# Patient Record
Sex: FEMALE | Race: ASIAN | NOT HISPANIC OR LATINO | Employment: UNEMPLOYED
[De-identification: names, ages, dates, MRNs, and addresses within clinical notes are randomized per-mention and may not be internally consistent; named-entity substitution may affect disease eponyms.]

---

## 2017-02-01 ENCOUNTER — HOSPITAL (OUTPATIENT)
Dept: OTHER | Age: 42
End: 2017-02-01
Attending: FAMILY MEDICINE

## 2017-02-01 LAB
% SATURATION: 24 % (ref 20–55)
A/G RATIO_: 1.4
ABS LYMPH: 1.6 K/CUMM (ref 1–3.5)
ABS MONO: 0.3 K/CUMM (ref 0.1–0.8)
ABS NEUTRO: 1.8 K/CUMM (ref 2–8)
ALBUMIN: 3.9 G/DL (ref 3.5–5)
ALK PHOS: 63 UNIT/L (ref 50–124)
ALT/GPT: 14 UNIT/L (ref 0–55)
ANION GAP SERPL CALC-SCNC: 10 MEQ/L (ref 10–20)
AST/GOT: 19 UNIT/L (ref 5–34)
BASOPHIL: 1 % (ref 0–1)
BILI TOTAL: 0.4 MG/DL (ref 0.2–1)
BUN SERPL-MCNC: 17 MG/DL (ref 6–20)
CALC TIBC: 375 UG/DL (ref 260–445)
CALCIUM: 9.5 MG/DL (ref 8.4–10.2)
CHLORIDE: 104 MEQ/L (ref 97–107)
CHOLESTEROL: 224 MG/DL
CREATININE: 0.72 MG/DL (ref 0.6–1.3)
CRP CARDIAC: 0.8 MG/L
DIFF_TYPE?: ABNORMAL
EOSINOPHIL: 5 % (ref 0–6)
FERRITIN: 37.8 NG/ML (ref 10–204)
FOLATE: 15 NG/ML (ref 7–31)
GLOBULIN_: 2.7 G/DL (ref 2–4.1)
GLUCOSE LVL: 86 MG/DL (ref 70–99)
HCT VFR BLD CALC: 38 % (ref 33–45)
HDLC SERPL-MCNC: 54 MG/DL (ref 40–60)
HEMOLYSIS 1+: NEGATIVE
HEMOLYSIS 2+: NEGATIVE
HEMOLYSIS 2+: NEGATIVE
HEMOLYSIS 3+: NEGATIVE
HGB BLD-MCNC: 13 G/DL (ref 11–15)
IMMATURE GRAN: 0.3 % (ref 0–0.3)
INSTR WBC: 3.9 K/CUMM (ref 4–11)
IRON LVL: 90 UG/DL (ref 35–135)
LDLC SERPL CALC-MCNC: 151 MG/DL
LIPEMIC 3+: NEGATIVE
LYMPHOCYTE: 40 %
MCH RBC QN AUTO: 31 PG (ref 25–35)
MCHC RBC AUTO-ENTMCNC: 34 G/DL (ref 32–37)
MCV RBC AUTO: 90 FL (ref 78–97)
MICROALB/CREAT RATIO: NORMAL UG/MG
MONOCYTE: 9 %
NEUTROPHIL: 45 %
NRBC BLD MANUAL-RTO: 0 % (ref 0–0.2)
PLATELET: 234 K/CUMM (ref 150–450)
POTASSIUM: 4.3 MEQ/L (ref 3.5–5.1)
PT IS FASTING: ABNORMAL
RBC # BLD: 4.22 M/CUMM (ref 3.7–5.2)
RDW: 11.9 % (ref 11.5–14.5)
SED RATE: 16 MM/HR (ref 0–20)
SODIUM: 139 MEQ/L (ref 136–145)
TCO2: 29 MEQ/L (ref 19–29)
TOTAL PROTEIN: 6.6 G/DL (ref 6.4–8.3)
TRANSFERRIN LEVEL: 268 MG/DL (ref 180–382)
TRIGL SERPL-MCNC: 97 MG/DL
TSH SERPL-ACNC: 1.36 MIU/ML (ref 0.4–5)
U CREATININE: 99.15 MG/DL
U MICROALBUMIN: <5 MG/L
UA APPEAR: CLEAR
UA BACTERIA: ABNORMAL
UA BILI: NEGATIVE
UA BLOOD: ABNORMAL
UA COLOR: YELLOW
UA EPITHELIAL: ABNORMAL
UA GLUCOSE: NEGATIVE
UA KETONES: NEGATIVE
UA LEUK EST: ABNORMAL
UA NITRITE: NEGATIVE
UA PH: 6 (ref 5–7)
UA PROTEIN: NEGATIVE
UA RBC: ABNORMAL
UA SPEC GRAV: 1.01 (ref 1.01–1.02)
UA UROBILINOGEN: 0.2 MG/DL (ref 0.2–1)
UA WBC: ABNORMAL
VIT D 25 OH LVL: 23.4 NG/ML
VITAMIN B12 LVL: 651 PG/ML (ref 213–816)
WBC # BLD: 3.9 K/CUMM (ref 4–11)

## 2017-04-21 ENCOUNTER — HOSPITAL (OUTPATIENT)
Dept: OTHER | Age: 42
End: 2017-04-21
Attending: FAMILY MEDICINE

## 2017-08-15 ENCOUNTER — HOSPITAL (OUTPATIENT)
Dept: OTHER | Age: 42
End: 2017-08-15
Attending: FAMILY MEDICINE

## 2017-08-15 LAB
ABS LYMPH: 1.8 K/CUMM (ref 1–3.5)
ABS MONO: 0.6 K/CUMM (ref 0.1–0.8)
ABS NEUTRO: 2.5 K/CUMM (ref 2–8)
BASOPHIL: 1 % (ref 0–1)
DIFF_TYPE?: NORMAL
EOSINOPHIL: 3 % (ref 0–6)
HCT VFR BLD CALC: 39 % (ref 33–45)
HEMOLYSIS 1+: NEGATIVE
HGB BLD-MCNC: 13.2 G/DL (ref 11–15)
IMMATURE GRAN: 0.2 % (ref 0–0.3)
INSTR WBC: 5.1 K/CUMM (ref 4–11)
LYMPHOCYTE: 35 %
MCH RBC QN AUTO: 31 PG (ref 25–35)
MCHC RBC AUTO-ENTMCNC: 34 G/DL (ref 32–37)
MCV RBC AUTO: 90 FL (ref 78–97)
MONOCYTE: 11 %
NEUTROPHIL: 50 %
NRBC BLD MANUAL-RTO: 0 % (ref 0–0.2)
PLATELET: 234 K/CUMM (ref 150–450)
RBC # BLD: 4.32 M/CUMM (ref 3.7–5.2)
RDW: 11.6 % (ref 11.5–14.5)
VIT D 25 OH LVL: 57.4 NG/ML
WBC # BLD: 5.1 K/CUMM (ref 4–11)

## 2018-01-23 ENCOUNTER — LAB SERVICES (OUTPATIENT)
Dept: OTHER | Age: 43
End: 2018-01-23

## 2018-01-23 ENCOUNTER — CHARTING TRANS (OUTPATIENT)
Dept: OTHER | Age: 43
End: 2018-01-23

## 2018-01-24 ENCOUNTER — CHARTING TRANS (OUTPATIENT)
Dept: OTHER | Age: 43
End: 2018-01-24

## 2018-01-24 ENCOUNTER — HOSPITAL (OUTPATIENT)
Dept: OTHER | Age: 43
End: 2018-01-24
Attending: OBSTETRICS & GYNECOLOGY

## 2018-01-24 LAB
ESTRADIOL SERPL-MCNC: 24 PG/ML
FSH SERPL-ACNC: 65.2 MUNITS/ML
LH SERPL-ACNC: 31.2 MUNITS/ML
T4 FREE SERPL-MCNC: 1.6 NG/DL (ref 0.8–1.5)
TSH SERPL-ACNC: <0.008 MCUNITS/ML (ref 0.35–5)

## 2018-01-25 ENCOUNTER — CHARTING TRANS (OUTPATIENT)
Dept: OTHER | Age: 43
End: 2018-01-25

## 2018-01-25 LAB — BACTERIA UR CULT: NORMAL

## 2018-01-26 ENCOUNTER — HOSPITAL (OUTPATIENT)
Dept: OTHER | Age: 43
End: 2018-01-26
Attending: PSYCHIATRY & NEUROLOGY

## 2018-01-26 LAB
ANA TITER: <40
BUN SERPL-MCNC: 13 MG/DL (ref 6–20)
CREATININE: 0.68 MG/DL (ref 0.6–1.3)
FOLATE: 15 NG/ML (ref 7–31)
HEMOLYSIS 1+: NEGATIVE
HEMOLYSIS 1+: NEGATIVE
HEMOLYSIS 4+: NEGATIVE
HOMOCYSTEINE: 5.9 UMOL/L (ref 3–11.5)
SED RATE: 58 MM/HR (ref 0–20)
SS-A IGG: <0.2 AI (ref 0–0.9)
SS-B IGG: <0.2 AI (ref 0–0.9)
VITAMIN B12 LVL: 378 PG/ML (ref 213–816)

## 2018-02-05 ENCOUNTER — HOSPITAL (OUTPATIENT)
Dept: OTHER | Age: 43
End: 2018-02-05
Attending: PSYCHIATRY & NEUROLOGY

## 2018-02-27 ENCOUNTER — CHARTING TRANS (OUTPATIENT)
Dept: OTHER | Age: 43
End: 2018-02-27

## 2018-03-17 ENCOUNTER — HOSPITAL (OUTPATIENT)
Dept: OTHER | Age: 43
End: 2018-03-17
Attending: INTERNAL MEDICINE

## 2018-04-12 ENCOUNTER — HOSPITAL (OUTPATIENT)
Dept: OTHER | Age: 43
End: 2018-04-12
Attending: FAMILY MEDICINE

## 2018-04-12 LAB
CONTROL LINE: PRESENT
CONTROL LINE: PRESENT
INFLU A POC: NORMAL
INFLU B POC: NORMAL
Lab: CLEAR
Lab: CLEAR
STREP POC: NEGATIVE

## 2018-11-02 VITALS
BODY MASS INDEX: 23.37 KG/M2 | SYSTOLIC BLOOD PRESSURE: 90 MMHG | WEIGHT: 127 LBS | HEIGHT: 62 IN | DIASTOLIC BLOOD PRESSURE: 68 MMHG

## 2018-12-08 ENCOUNTER — HOSPITAL (OUTPATIENT)
Dept: OTHER | Age: 43
End: 2018-12-08
Attending: INTERNAL MEDICINE

## 2019-08-29 ENCOUNTER — OFFICE VISIT (OUTPATIENT)
Dept: NEUROLOGY | Facility: CLINIC | Age: 44
End: 2019-08-29
Payer: COMMERCIAL

## 2019-08-29 VITALS
HEART RATE: 82 BPM | DIASTOLIC BLOOD PRESSURE: 62 MMHG | WEIGHT: 125 LBS | RESPIRATION RATE: 16 BRPM | BODY MASS INDEX: 22.15 KG/M2 | SYSTOLIC BLOOD PRESSURE: 102 MMHG | HEIGHT: 63 IN

## 2019-08-29 DIAGNOSIS — R42 DIZZINESS: ICD-10-CM

## 2019-08-29 DIAGNOSIS — H93.13 TINNITUS OF BOTH EARS: ICD-10-CM

## 2019-08-29 DIAGNOSIS — R41.3 MEMORY LOSS: Primary | ICD-10-CM

## 2019-08-29 DIAGNOSIS — H53.8 BLURRY VISION: ICD-10-CM

## 2019-08-29 PROCEDURE — 99204 OFFICE O/P NEW MOD 45 MIN: CPT | Performed by: OTHER

## 2019-08-29 RX ORDER — LEVOTHYROXINE SODIUM 0.05 MG/1
50 TABLET ORAL
COMMUNITY

## 2019-08-29 RX ORDER — BIOTIN 1 MG
1 TABLET ORAL DAILY
COMMUNITY

## 2019-08-29 RX ORDER — ALENDRONATE SODIUM 70 MG/1
70 TABLET ORAL WEEKLY
COMMUNITY

## 2019-08-29 NOTE — H&P
[facility] New Patient / Consult Visit    Jd Jaramillo is a 37year old female. Referring MD: No ref.  provider found    Patient presents with:  Memory Loss: C/O of short term memory loss      HPI:    Sabine Radford pains, bowel / bladder incontinence or mood issues.      Past Medical History:   Diagnosis Date   • Hypothyroidism    • Osteoporosis      Past Surgical History:   Procedure Laterality Date   • ANESTH, SECTION      2   • UMBILICAL HERNIA REPAIR Pupils: equally round and reactive to light with direct and consensual responses, normal accomodation   Visual acuity: Normal              Visual fields: Normal  Oculomotor/Trochlear/Abducens:    Eye Movements: EOMI without nystagmus  Trigeminal:   Faci addition I will check for reversible causes of cognitive impairment, as noted below ( B12)    Given the mild findings on exam at this time, it was discussed with patient that she did not need any medication at this time.  She was advised that we would wait

## 2019-09-18 ENCOUNTER — HOSPITAL ENCOUNTER (OUTPATIENT)
Dept: MRI IMAGING | Facility: HOSPITAL | Age: 44
Discharge: HOME | End: 2019-09-18
Attending: Other
Payer: COMMERCIAL

## 2019-09-18 DIAGNOSIS — H53.8 BLURRY VISION: ICD-10-CM

## 2019-09-18 DIAGNOSIS — R42 DIZZINESS: ICD-10-CM

## 2019-09-18 DIAGNOSIS — H93.13 TINNITUS OF BOTH EARS: ICD-10-CM

## 2019-09-18 DIAGNOSIS — R41.3 MEMORY LOSS: ICD-10-CM

## 2019-09-18 PROCEDURE — 70553 MRI BRAIN STEM W/O & W/DYE: CPT | Performed by: OTHER

## 2019-09-18 PROCEDURE — A9575 INJ GADOTERATE MEGLUMI 0.1ML: HCPCS | Performed by: OTHER

## 2019-09-18 PROCEDURE — 70544 MR ANGIOGRAPHY HEAD W/O DYE: CPT | Performed by: OTHER

## 2019-10-09 ENCOUNTER — TELEPHONE (OUTPATIENT)
Dept: NEUROLOGY | Facility: CLINIC | Age: 44
End: 2019-10-09

## 2019-10-09 NOTE — TELEPHONE ENCOUNTER
Called patient; discussed findings on MRA - possible ICA aneurysm vs infundibulum - will plan to monitor and consider CTA brain next visit - reassurance given to patient

## 2019-11-18 ENCOUNTER — HOSPITAL (OUTPATIENT)
Dept: OTHER | Age: 44
End: 2019-11-18
Attending: NURSE PRACTITIONER

## 2019-11-18 LAB
CONTROL LINE: PRESENT
Lab: CLEAR
STREP POC: NEGATIVE

## 2019-11-25 ENCOUNTER — OFFICE VISIT (OUTPATIENT)
Dept: NEUROLOGY | Facility: CLINIC | Age: 44
End: 2019-11-25
Payer: COMMERCIAL

## 2019-11-25 VITALS
SYSTOLIC BLOOD PRESSURE: 110 MMHG | DIASTOLIC BLOOD PRESSURE: 80 MMHG | BODY MASS INDEX: 23 KG/M2 | WEIGHT: 128 LBS | HEART RATE: 68 BPM

## 2019-11-25 DIAGNOSIS — R41.3 MEMORY LOSS: ICD-10-CM

## 2019-11-25 DIAGNOSIS — H53.8 BLURRY VISION: ICD-10-CM

## 2019-11-25 DIAGNOSIS — H93.13 TINNITUS OF BOTH EARS: ICD-10-CM

## 2019-11-25 DIAGNOSIS — R90.89 ABNORMAL MRA, BRAIN: ICD-10-CM

## 2019-11-25 DIAGNOSIS — R51.9 CHRONIC NONINTRACTABLE HEADACHE, UNSPECIFIED HEADACHE TYPE: Primary | ICD-10-CM

## 2019-11-25 DIAGNOSIS — R42 DIZZINESS: ICD-10-CM

## 2019-11-25 DIAGNOSIS — G89.29 CHRONIC NONINTRACTABLE HEADACHE, UNSPECIFIED HEADACHE TYPE: Primary | ICD-10-CM

## 2019-11-25 PROCEDURE — 99214 OFFICE O/P EST MOD 30 MIN: CPT | Performed by: OTHER

## 2019-11-25 RX ORDER — AMITRIPTYLINE HYDROCHLORIDE 10 MG/1
TABLET, FILM COATED ORAL
Qty: 60 TABLET | Refills: 2 | Status: SHIPPED | OUTPATIENT
Start: 2019-11-25 | End: 2020-02-27

## 2019-11-25 NOTE — PROGRESS NOTES
[facility] Progress Note    HPI  No chief complaint on file. As per my initial H&P from 8/29/19,   \" Osmar Bishop is a 37year old, who presents for evaluation of memory loss.       Patient states she has not been having any menst week, with pain behind R eye with light /sound sensitivity and no n/v. She still feels her memory is poor and forgets lists that she is making. She has been sleeping a bit better than in the past; states she wakes up in the middle of the night still. calculated from the following:    Height as of 8/29/19: 63\". Weight as of this encounter: 128 lb (58.1 kg).     Gen: well developed, well nourished, no acute distress  HEENT: normocephalic  Heart; normal H0/W5, regular rate and rhythm  Lungs: clear to a The expected major intracranial flow voids are present. MRA BRAIN  There is a 2.8 mm outpouching off the supra clinoid left internal carotid artery extending in a caudal direction. This may be due to an infundibulum. This may be due to an aneurysm. 10 mg nightly, then increasing to 20 mg nightly after 1 week. Patient was advised of potential side effects, including but not limited to excessive sedation, constipation, dry mouth, dizziness, and/or weight gain.   She was advised to notify office with an

## 2019-11-26 ENCOUNTER — HOSPITAL (OUTPATIENT)
Dept: OTHER | Age: 44
End: 2019-11-26
Attending: EMERGENCY MEDICINE

## 2019-11-27 ENCOUNTER — HOSPITAL (OUTPATIENT)
Dept: OTHER | Age: 44
End: 2019-11-27
Attending: EMERGENCY MEDICINE

## 2019-11-27 LAB
ABS LYMPH: 2.4 K/CUMM (ref 1–3.5)
ABS MONO: 0.7 K/CUMM (ref 0.1–0.8)
ABS NEUTRO: 4.3 K/CUMM (ref 2–8)
ANION GAP SERPL CALC-SCNC: 11 MEQ/L (ref 10–20)
BASOPHIL: 0 % (ref 0–1)
BUN SERPL-MCNC: 10 MG/DL (ref 6–20)
CALCIUM: 9.7 MG/DL (ref 8.4–10.2)
CHLORIDE: 104 MEQ/L (ref 97–107)
CREATININE: 0.82 MG/DL (ref 0.6–1.3)
CRP INFLAMMATION: 1.5 MG/L (ref 0.1–8.2)
DIFF_TYPE?: NORMAL
EOSINOPHIL: 1 % (ref 0–6)
GLUCOSE LVL: 106 MG/DL (ref 70–99)
HCT VFR BLD CALC: 39 % (ref 33–45)
HEMOLYSIS 2+: NEGATIVE
HGB BLD-MCNC: 13.2 G/DL (ref 11–15)
IMMATURE GRAN: 0.1 % (ref 0–0.3)
INSTR WBC: 7.6 K/CUMM (ref 4–11)
LYMPHOCYTE: 32 %
MCH RBC QN AUTO: 30 PG (ref 25–35)
MCHC RBC AUTO-ENTMCNC: 34 G/DL (ref 32–37)
MCV RBC AUTO: 89 FL (ref 78–97)
MONOCYTE: 9 %
NEUTROPHIL: 57 %
NRBC BLD MANUAL-RTO: 0 % (ref 0–0.2)
PLATELET: 306 K/CUMM (ref 150–450)
POTASSIUM: 3.9 MEQ/L (ref 3.5–5.1)
RBC # BLD: 4.36 M/CUMM (ref 3.7–5.2)
RDW: 11.6 % (ref 11.5–14.5)
SED RATE: 30 MM/HR (ref 0–20)
SODIUM: 141 MEQ/L (ref 136–145)
TCO2: 30 MEQ/L (ref 19–29)
WBC # BLD: 7.6 K/CUMM (ref 4–11)

## 2019-12-03 ENCOUNTER — HOSPITAL ENCOUNTER (OUTPATIENT)
Dept: CT IMAGING | Facility: HOSPITAL | Age: 44
Discharge: HOME | End: 2019-12-03
Attending: Other
Payer: COMMERCIAL

## 2019-12-03 ENCOUNTER — APPOINTMENT (OUTPATIENT)
Dept: LAB | Facility: HOSPITAL | Age: 44
End: 2019-12-03
Attending: Other
Payer: COMMERCIAL

## 2019-12-03 DIAGNOSIS — R42 DIZZINESS: ICD-10-CM

## 2019-12-03 DIAGNOSIS — H53.8 BLURRY VISION: ICD-10-CM

## 2019-12-03 DIAGNOSIS — R90.89 ABNORMAL MRA, BRAIN: ICD-10-CM

## 2019-12-03 DIAGNOSIS — H93.13 TINNITUS OF BOTH EARS: ICD-10-CM

## 2019-12-03 PROCEDURE — 70498 CT ANGIOGRAPHY NECK: CPT | Performed by: OTHER

## 2019-12-03 PROCEDURE — 70496 CT ANGIOGRAPHY HEAD: CPT | Performed by: OTHER

## 2019-12-03 PROCEDURE — 36415 COLL VENOUS BLD VENIPUNCTURE: CPT | Performed by: OTHER

## 2019-12-03 PROCEDURE — 82607 VITAMIN B-12: CPT | Performed by: OTHER

## 2019-12-03 PROCEDURE — 82565 ASSAY OF CREATININE: CPT

## 2019-12-05 ENCOUNTER — HOSPITAL ENCOUNTER (OUTPATIENT)
Dept: ELECTROPHYSIOLOGY | Facility: HOSPITAL | Age: 44
Discharge: HOME | End: 2019-12-05
Attending: Other
Payer: COMMERCIAL

## 2019-12-05 DIAGNOSIS — R41.3 MEMORY LOSS: ICD-10-CM

## 2019-12-05 DIAGNOSIS — R42 DIZZINESS: ICD-10-CM

## 2019-12-05 DIAGNOSIS — H53.8 BLURRY VISION: ICD-10-CM

## 2019-12-05 DIAGNOSIS — H93.13 TINNITUS OF BOTH EARS: ICD-10-CM

## 2019-12-05 PROCEDURE — 95816 EEG AWAKE AND DROWSY: CPT | Performed by: OTHER

## 2019-12-05 NOTE — PROCEDURES
EEG report    REFERRING PHYSICIAN: Mohit Wytat MD    PCP and phone number:  Ruth Ann Escobedo  [phone]    TECHNIQUE: 21 channels of EEG, 2 channels of EOG, and 1 channel of EKG were recorded utilizing the [employer] 10/20 System.  The r

## 2019-12-06 ENCOUNTER — TELEPHONE (OUTPATIENT)
Dept: NEUROLOGY | Facility: CLINIC | Age: 44
End: 2019-12-06

## 2019-12-06 NOTE — TELEPHONE ENCOUNTER
----- Message from Jalen Herrera MD sent at 12/6/2019 10:02 AM CST -----  Results noted, CTA brain shows no aneurysm; let patient know - likely infundibulum which is not at risk of rupture; no significant narrowing; noted; let patient know nothing to wor

## 2019-12-06 NOTE — TELEPHONE ENCOUNTER
Kd Lugo notified, CTA brain is normal, EEG is normal, Vitamin B12 and creatinine is WNL. Patient will follow up with Dr Jamaal Osuna on 02/21/2020. All the questions answered and patient verbalized the understanding.

## 2020-01-01 ENCOUNTER — EXTERNAL RECORD (OUTPATIENT)
Dept: OTHER | Age: 45
End: 2020-01-01

## 2020-02-21 ENCOUNTER — OFFICE VISIT (OUTPATIENT)
Dept: NEUROLOGY | Facility: CLINIC | Age: 45
End: 2020-02-21
Payer: COMMERCIAL

## 2020-02-21 VITALS
DIASTOLIC BLOOD PRESSURE: 61 MMHG | WEIGHT: 125 LBS | SYSTOLIC BLOOD PRESSURE: 103 MMHG | HEART RATE: 62 BPM | BODY MASS INDEX: 22.15 KG/M2 | HEIGHT: 63 IN | RESPIRATION RATE: 16 BRPM

## 2020-02-21 DIAGNOSIS — R41.3 MEMORY LOSS: Primary | ICD-10-CM

## 2020-02-21 PROCEDURE — 99214 OFFICE O/P EST MOD 30 MIN: CPT | Performed by: OTHER

## 2020-02-21 NOTE — PROGRESS NOTES
[facility] Progress Note    HPI  Patient presents with:  Headache: Follow up  Memory Loss: Follow up      As per my initial H&P from 8/29/19,   \" Raymundo Park is a 37year old, who presents for evaluation of memory loss.       Patient last visit was recommended to start taking amitriptyline but she only took 10 mg and stopped taking this - she is not having frequent headaches now. She still feels she has poor memory, admits to difficulty with going to sleep, and staying asleep.   She con Systems:  No chest pain or palpitations; otherwise as noted in HPI.     Exam:  /61 (BP Location: Left arm, Patient Position: Sitting, Cuff Size: adult)   Pulse 62   Resp 16   Ht 63\"   Wt 125 lb (56.7 kg)   BMI 22.14 kg/m²   Estimated body mass index sinuses and mastoid air cells are unremarkable. The upper cervical, petrous, cavernous, and supraclinoid internal carotid arteries are unremarkable. An anterior communicating artery is seen. Azygous proximal A2 segment is noted.   The branches of the is no acute intracranial hemorrhage or extra-axial fluid collection identified. There is no parenchymal signal abnormality identified. No significant abnormal parenchymal gradient susceptibility.        There is no abnormal parenchymal or leptom continues to be normal, with [place] score 28/30, despite her perceived memory deficits. She continues to have poor sleep patterns, and I again informed her that poor sleep could contribute to poor memory retention.   In addition, she previously was having

## 2020-02-27 DIAGNOSIS — R51.9 CHRONIC NONINTRACTABLE HEADACHE, UNSPECIFIED HEADACHE TYPE: ICD-10-CM

## 2020-02-27 DIAGNOSIS — G89.29 CHRONIC NONINTRACTABLE HEADACHE, UNSPECIFIED HEADACHE TYPE: ICD-10-CM

## 2020-02-27 RX ORDER — AMITRIPTYLINE HYDROCHLORIDE 10 MG/1
TABLET, FILM COATED ORAL
Qty: 180 TABLET | Refills: 0 | Status: SHIPPED | OUTPATIENT
Start: 2020-02-27

## 2020-02-27 NOTE — TELEPHONE ENCOUNTER
Medication:Amitriptyline 20 mg    Date of last refill: 11/25/2019 with 2 addt refills      Last office visit: 2/21/2020  Due back to clinic per last office note:  RTN in 3 months  Date next office visit scheduled:  No future appointments.     Last OV note r in counseling and coordination of care, including discussion of differential diagnosis for memory loss, as well as extensive work-up to date, with plan of care and recommendations as noted above; patient allowed to ask questions and all questions answered

## 2020-03-06 ENCOUNTER — HOSPITAL (OUTPATIENT)
Dept: OTHER | Age: 45
End: 2020-03-06
Attending: PHYSICIAN ASSISTANT

## 2020-03-17 ENCOUNTER — HOSPITAL (OUTPATIENT)
Dept: OTHER | Age: 45
End: 2020-03-17
Attending: NURSE PRACTITIONER

## 2020-03-17 LAB
A/G RATIO_: 1.5
ABS LYMPH: 1.4 K/CUMM (ref 1–3.5)
ABS MONO: 0.5 K/CUMM (ref 0.1–0.8)
ABS NEUTRO: 2.7 K/CUMM (ref 2–8)
ALBUMIN: 4.1 G/DL (ref 3.5–5)
ALK PHOS: 48 UNIT/L (ref 50–124)
ALT/GPT: 57 UNIT/L (ref 0–55)
ANION GAP SERPL CALC-SCNC: 13 MEQ/L (ref 10–20)
AST/GOT: 60 UNIT/L (ref 5–34)
BASOPHIL: 1 % (ref 0–1)
BILI TOTAL: 0.2 MG/DL (ref 0.2–1)
BUN SERPL-MCNC: 13 MG/DL (ref 6–20)
CALCIUM: 9.1 MG/DL (ref 8.4–10.2)
CHLORIDE: 105 MEQ/L (ref 97–107)
CONTROL LINE: PRESENT
CREATININE: 0.74 MG/DL (ref 0.6–1.3)
DIFF_TYPE?: NORMAL
EOSINOPHIL: 2 % (ref 0–6)
GLOBULIN_: 2.7 G/DL (ref 2–4.1)
GLUCOSE LVL: 86 MG/DL (ref 70–99)
HCT VFR BLD CALC: 39 % (ref 33–45)
HEMOLYSIS 2+: NEGATIVE
HGB BLD-MCNC: 12.8 G/DL (ref 11–15)
IMMATURE GRAN: 0 % (ref 0–0.3)
INFLU A POC: NORMAL
INFLU B POC: NORMAL
INSTR WBC: 4.7 K/CUMM (ref 4–11)
LIPEMIC 3+: NEGATIVE
LYMPHOCYTE: 29 %
Lab: CLEAR
MCH RBC QN AUTO: 31 PG (ref 25–35)
MCHC RBC AUTO-ENTMCNC: 33 G/DL (ref 32–37)
MCV RBC AUTO: 93 FL (ref 78–97)
MONOCYTE: 10 %
NEUTROPHIL: 58 %
NRBC BLD MANUAL-RTO: 0 % (ref 0–0.2)
PLATELET: 208 K/CUMM (ref 150–450)
POTASSIUM: 4 MEQ/L (ref 3.5–5.1)
RBC # BLD: 4.18 M/CUMM (ref 3.7–5.2)
RDW: 11.8 % (ref 11.5–14.5)
SODIUM: 140 MEQ/L (ref 136–145)
TCO2: 26 MEQ/L (ref 19–29)
TOTAL PROTEIN: 6.8 G/DL (ref 6.4–8.3)
TSH SERPL-ACNC: 1.43 MIU/ML (ref 0.4–5)
WBC # BLD: 4.7 K/CUMM (ref 4–11)

## 2020-03-27 ENCOUNTER — HOSPITAL (OUTPATIENT)
Dept: OTHER | Age: 45
End: 2020-03-27

## 2020-03-29 ENCOUNTER — HOSPITAL (OUTPATIENT)
Dept: OTHER | Age: 45
End: 2020-03-29

## 2020-03-29 LAB
COLLECTION SOURCE OF SPECIMEN?: NORMAL
SARS-COV-2 RNA SPEC QL NAA+PROBE: NOT DETECTED
SPECIMEN SOURCE: NORMAL

## 2020-03-31 LAB — SARS-COV-2 RNA SPEC QL NAA+PROBE: NOT DETECTED

## 2020-11-12 ENCOUNTER — TELEPHONE (OUTPATIENT)
Dept: NEUROLOGY | Facility: CLINIC | Age: 45
End: 2020-11-12

## 2020-11-12 NOTE — TELEPHONE ENCOUNTER
Pt seeing [facility]geon and requesting records. Assisted with printing [facility] form and advised to fax to us for records. Gave her radiology phone number to obtain CD of images.

## 2020-11-16 ENCOUNTER — TELEPHONE (OUTPATIENT)
Dept: NEUROLOGY | Facility: CLINIC | Age: 45
End: 2020-11-16

## 2021-07-14 ENCOUNTER — HOSPITAL ENCOUNTER (OUTPATIENT)
Dept: NUCLEAR MEDICINE | Age: 46
Discharge: HOME | End: 2021-07-14
Attending: INTERNAL MEDICINE

## 2021-07-14 DIAGNOSIS — M81.0 OSTEOPOROSIS: ICD-10-CM

## 2021-07-14 PROCEDURE — 77080 DXA BONE DENSITY AXIAL: CPT

## 2021-12-02 ENCOUNTER — LAB SERVICES (OUTPATIENT)
Dept: LAB | Age: 46
End: 2021-12-02
Attending: FAMILY MEDICINE

## 2021-12-02 ENCOUNTER — HOSPITAL ENCOUNTER (OUTPATIENT)
Dept: GENERAL RADIOLOGY | Age: 46
Discharge: HOME | End: 2021-12-02
Attending: FAMILY MEDICINE

## 2021-12-02 DIAGNOSIS — R41.3 MEMORY LOSS: ICD-10-CM

## 2021-12-02 DIAGNOSIS — E03.9 ACQUIRED HYPOTHYROIDISM: ICD-10-CM

## 2021-12-02 DIAGNOSIS — M54.2 CERVICALGIA: ICD-10-CM

## 2021-12-02 DIAGNOSIS — E55.9 AVITAMINOSIS D: Primary | ICD-10-CM

## 2021-12-02 DIAGNOSIS — Z00.00 ROUTINE GENERAL MEDICAL EXAMINATION AT A HEALTH CARE FACILITY: ICD-10-CM

## 2021-12-02 LAB
ALBUMIN SERPL-MCNC: 3.9 G/DL (ref 3.6–5.1)
ALBUMIN/GLOB SERPL: 1 {RATIO} (ref 1–2.4)
ALP SERPL-CCNC: 46 UNITS/L (ref 45–117)
ALT SERPL-CCNC: 22 UNITS/L
AMORPH SED URNS QL MICRO: PRESENT
ANION GAP SERPL CALC-SCNC: 12 MMOL/L (ref 10–20)
APPEARANCE UR: ABNORMAL
AST SERPL-CCNC: 22 UNITS/L
BACTERIA #/AREA URNS HPF: ABNORMAL /HPF
BASOPHILS # BLD: 0.1 K/MCL (ref 0–0.3)
BASOPHILS NFR BLD: 1 %
BILIRUB SERPL-MCNC: 0.4 MG/DL (ref 0.2–1)
BILIRUB UR QL STRIP: NEGATIVE
BUN SERPL-MCNC: 13 MG/DL (ref 6–20)
BUN/CREAT SERPL: 18 (ref 7–25)
CALCIUM SERPL-MCNC: 8.9 MG/DL (ref 8.4–10.2)
CHLORIDE SERPL-SCNC: 103 MMOL/L (ref 98–107)
CHOLEST SERPL-MCNC: 247 MG/DL
CHOLEST/HDLC SERPL: 3.4 {RATIO}
CO2 SERPL-SCNC: 29 MMOL/L (ref 21–32)
COLOR UR: YELLOW
CREAT SERPL-MCNC: 0.74 MG/DL (ref 0.51–0.95)
DEPRECATED RDW RBC: 39.3 FL (ref 39–50)
EOSINOPHIL # BLD: 0.2 K/MCL (ref 0–0.5)
EOSINOPHIL NFR BLD: 3 %
ERYTHROCYTE [DISTWIDTH] IN BLOOD: 12 % (ref 11–15)
ERYTHROCYTE [SEDIMENTATION RATE] IN BLOOD BY WESTERGREN METHOD: 33 MM/HR (ref 0–20)
FASTING DURATION TIME PATIENT: ABNORMAL H
FASTING DURATION TIME PATIENT: NORMAL H
FERRITIN SERPL-MCNC: 94 NG/ML (ref 8–252)
FOLATE SERPL-MCNC: 11.5 NG/ML
GFR SERPLBLD BASED ON 1.73 SQ M-ARVRAT: >90 ML/MIN
GLOBULIN SER-MCNC: 3.8 G/DL (ref 2–4)
GLUCOSE SERPL-MCNC: 88 MG/DL (ref 70–99)
GLUCOSE UR STRIP-MCNC: NEGATIVE MG/DL
HCT VFR BLD CALC: 41.2 % (ref 36–46.5)
HDLC SERPL-MCNC: 72 MG/DL
HGB BLD-MCNC: 13.6 G/DL (ref 12–15.5)
HGB UR QL STRIP: ABNORMAL
HYALINE CASTS #/AREA URNS LPF: ABNORMAL /LPF
IMM GRANULOCYTES # BLD AUTO: 0 K/MCL (ref 0–0.2)
IMM GRANULOCYTES # BLD: 0 %
IRON SATN MFR SERPL: 28 % (ref 15–45)
IRON SERPL-MCNC: 100 MCG/DL (ref 50–170)
KETONES UR STRIP-MCNC: NEGATIVE MG/DL
LDLC SERPL CALC-MCNC: 154 MG/DL
LEUKOCYTE ESTERASE UR QL STRIP: ABNORMAL
LYMPHOCYTES # BLD: 1.6 K/MCL (ref 1–4.8)
LYMPHOCYTES NFR BLD: 28 %
MAGNESIUM SERPL-MCNC: 2.1 MG/DL (ref 1.7–2.4)
MCH RBC QN AUTO: 29.7 PG (ref 26–34)
MCHC RBC AUTO-ENTMCNC: 33 G/DL (ref 32–36.5)
MCV RBC AUTO: 90 FL (ref 78–100)
MONOCYTES # BLD: 0.4 K/MCL (ref 0.3–0.9)
MONOCYTES NFR BLD: 8 %
NEUTROPHILS # BLD: 3.3 K/MCL (ref 1.8–7.7)
NEUTROPHILS NFR BLD: 60 %
NITRITE UR QL STRIP: NEGATIVE
NONHDLC SERPL-MCNC: 175 MG/DL
NRBC BLD MANUAL-RTO: 0 /100 WBC
PH UR STRIP: 5.5 [PH] (ref 5–7)
PLATELET # BLD AUTO: 271 K/MCL (ref 140–450)
POTASSIUM SERPL-SCNC: 4.2 MMOL/L (ref 3.4–5.1)
PROLACTIN SERPL-MCNC: 2.8 NG/ML (ref 2.8–29.2)
PROT SERPL-MCNC: 7.7 G/DL (ref 6.4–8.2)
PROT UR STRIP-MCNC: NEGATIVE MG/DL
RBC # BLD: 4.58 MIL/MCL (ref 4–5.2)
RBC #/AREA URNS HPF: ABNORMAL /HPF
SODIUM SERPL-SCNC: 140 MMOL/L (ref 135–145)
SP GR UR STRIP: >=1.03 (ref 1–1.03)
SQUAMOUS #/AREA URNS HPF: ABNORMAL /HPF
T3FREE SERPL-MCNC: 2.9 PG/ML (ref 2.2–4)
T4 FREE SERPL-MCNC: 0.9 NG/DL (ref 0.8–1.5)
TIBC SERPL-MCNC: 351 MCG/DL (ref 250–450)
TRIGL SERPL-MCNC: 106 MG/DL
TSH SERPL-ACNC: 1.54 MCUNITS/ML (ref 0.35–5)
UROBILINOGEN UR STRIP-MCNC: 0.2 MG/DL
VIT B12 SERPL-MCNC: 332 PG/ML (ref 211–911)
WBC # BLD: 5.5 K/MCL (ref 4.2–11)
WBC #/AREA URNS HPF: ABNORMAL /HPF

## 2021-12-02 PROCEDURE — 84481 FREE ASSAY (FT-3): CPT

## 2021-12-02 PROCEDURE — 84466 ASSAY OF TRANSFERRIN: CPT

## 2021-12-02 PROCEDURE — 72052 X-RAY EXAM NECK SPINE 6/>VWS: CPT

## 2021-12-02 PROCEDURE — 84146 ASSAY OF PROLACTIN: CPT

## 2021-12-02 PROCEDURE — 85025 COMPLETE CBC W/AUTO DIFF WBC: CPT

## 2021-12-02 PROCEDURE — 82607 VITAMIN B-12: CPT

## 2021-12-02 PROCEDURE — 80061 LIPID PANEL: CPT

## 2021-12-02 PROCEDURE — 83550 IRON BINDING TEST: CPT

## 2021-12-02 PROCEDURE — 82728 ASSAY OF FERRITIN: CPT

## 2021-12-02 PROCEDURE — 84439 ASSAY OF FREE THYROXINE: CPT

## 2021-12-02 PROCEDURE — 80053 COMPREHEN METABOLIC PANEL: CPT

## 2021-12-02 PROCEDURE — 83735 ASSAY OF MAGNESIUM: CPT

## 2021-12-02 PROCEDURE — 81001 URINALYSIS AUTO W/SCOPE: CPT

## 2021-12-02 PROCEDURE — 84443 ASSAY THYROID STIM HORMONE: CPT

## 2021-12-02 PROCEDURE — 85652 RBC SED RATE AUTOMATED: CPT

## 2021-12-02 PROCEDURE — 82746 ASSAY OF FOLIC ACID SERUM: CPT

## 2021-12-02 PROCEDURE — 87086 URINE CULTURE/COLONY COUNT: CPT

## 2021-12-03 LAB
BACTERIA UR CULT: NORMAL
TRANSFERRIN SERPL-MCNC: 308 MG/DL (ref 202–336)

## 2022-01-04 ENCOUNTER — WALK IN (OUTPATIENT)
Dept: URGENT CARE | Age: 47
End: 2022-01-04
Attending: FAMILY MEDICINE

## 2022-01-04 ENCOUNTER — HOSPITAL ENCOUNTER (OUTPATIENT)
Dept: GENERAL RADIOLOGY | Age: 47
Discharge: HOME | End: 2022-01-04
Attending: FAMILY MEDICINE

## 2022-01-04 VITALS
DIASTOLIC BLOOD PRESSURE: 74 MMHG | SYSTOLIC BLOOD PRESSURE: 116 MMHG | TEMPERATURE: 99.4 F | OXYGEN SATURATION: 96 % | RESPIRATION RATE: 18 BRPM | HEART RATE: 74 BPM

## 2022-01-04 DIAGNOSIS — U07.1 COVID-19 VIRUS INFECTION: Primary | ICD-10-CM

## 2022-01-04 PROCEDURE — 99213 OFFICE O/P EST LOW 20 MIN: CPT

## 2022-01-04 PROCEDURE — 71046 X-RAY EXAM CHEST 2 VIEWS: CPT

## 2022-01-04 RX ORDER — ALBUTEROL SULFATE 90 UG/1
2 AEROSOL, METERED RESPIRATORY (INHALATION) EVERY 4 HOURS PRN
Qty: 1 EACH | Refills: 0 | Status: SHIPPED | OUTPATIENT
Start: 2022-01-04

## 2022-01-04 RX ORDER — PREDNISONE 20 MG/1
40 TABLET ORAL DAILY
Qty: 10 TABLET | Refills: 0 | Status: SHIPPED | OUTPATIENT
Start: 2022-01-04 | End: 2022-01-09

## 2022-01-04 RX ORDER — MECLIZINE HCL 12.5 MG/1
TABLET ORAL
COMMUNITY
Start: 2021-11-24

## 2022-01-04 RX ORDER — ALENDRONATE SODIUM 70 MG/1
TABLET ORAL
COMMUNITY
Start: 2021-12-26

## 2022-01-04 ASSESSMENT — ENCOUNTER SYMPTOMS
BLOOD IN STOOL: 0
DIZZINESS: 0
WEAKNESS: 0
VOICE CHANGE: 0
EYE PAIN: 0
COUGH: 1
FEVER: 0
NAUSEA: 0
STRIDOR: 0
SHORTNESS OF BREATH: 1
COLOR CHANGE: 0
HEADACHES: 0
ABDOMINAL PAIN: 0
DIARRHEA: 0
POLYPHAGIA: 0
ADENOPATHY: 0
SPEECH DIFFICULTY: 0
BRUISES/BLEEDS EASILY: 0
AGITATION: 0
EYE DISCHARGE: 0
VOMITING: 0
CHEST TIGHTNESS: 0
SINUS PRESSURE: 0
EYE REDNESS: 0
WOUND: 0
CONSTIPATION: 0
SINUS PAIN: 0
POLYDIPSIA: 0
SORE THROAT: 0
WHEEZING: 0
NERVOUS/ANXIOUS: 0
BACK PAIN: 0
FATIGUE: 0

## 2022-01-04 ASSESSMENT — PAIN SCALES - GENERAL: PAINLEVEL: 1

## 2022-01-18 ENCOUNTER — OFFICE VISIT (OUTPATIENT)
Dept: NEUROSURGERY | Age: 47
End: 2022-01-18

## 2022-01-25 ENCOUNTER — OFFICE VISIT (OUTPATIENT)
Dept: NEUROSURGERY | Age: 47
End: 2022-01-25

## 2022-02-01 ENCOUNTER — APPOINTMENT (OUTPATIENT)
Dept: NEUROSURGERY | Age: 47
End: 2022-02-01

## 2022-02-28 ENCOUNTER — LAB SERVICES (OUTPATIENT)
Dept: LAB | Age: 47
End: 2022-02-28
Attending: FAMILY MEDICINE

## 2022-02-28 ENCOUNTER — HOSPITAL ENCOUNTER (OUTPATIENT)
Dept: MAMMOGRAPHY | Age: 47
Discharge: HOME | End: 2022-02-28
Attending: FAMILY MEDICINE

## 2022-02-28 ENCOUNTER — HOSPITAL ENCOUNTER (OUTPATIENT)
Dept: ULTRASOUND IMAGING | Age: 47
Discharge: HOME | End: 2022-02-28
Attending: FAMILY MEDICINE

## 2022-02-28 DIAGNOSIS — D51.9 ANEMIA DUE TO VITAMIN B12 DEFICIENCY, UNSPECIFIED B12 DEFICIENCY TYPE: ICD-10-CM

## 2022-02-28 DIAGNOSIS — R22.1 LUMP IN NECK: ICD-10-CM

## 2022-02-28 DIAGNOSIS — R70.0 ELEVATED ERYTHROCYTE SEDIMENTATION RATE: Primary | ICD-10-CM

## 2022-02-28 DIAGNOSIS — R22.1 NECK MASS: ICD-10-CM

## 2022-02-28 DIAGNOSIS — Z12.31 ENCOUNTER FOR SCREENING MAMMOGRAM FOR MALIGNANT NEOPLASM OF BREAST: ICD-10-CM

## 2022-02-28 LAB
CRP SERPL-MCNC: <0.3 MG/DL
ERYTHROCYTE [SEDIMENTATION RATE] IN BLOOD BY WESTERGREN METHOD: 13 MM/HR (ref 0–20)
FOLATE SERPL-MCNC: 11.7 NG/ML
RHEUMATOID FACT SER NEPH-ACNC: <10 UNITS/ML
VIT B12 SERPL-MCNC: 480 PG/ML (ref 211–911)

## 2022-02-28 PROCEDURE — 86015 ACTIN ANTIBODY EACH: CPT

## 2022-02-28 PROCEDURE — 85652 RBC SED RATE AUTOMATED: CPT

## 2022-02-28 PROCEDURE — 86431 RHEUMATOID FACTOR QUANT: CPT

## 2022-02-28 PROCEDURE — 86147 CARDIOLIPIN ANTIBODY EA IG: CPT

## 2022-02-28 PROCEDURE — 86225 DNA ANTIBODY NATIVE: CPT

## 2022-02-28 PROCEDURE — 86146 BETA-2 GLYCOPROTEIN ANTIBODY: CPT

## 2022-02-28 PROCEDURE — 82607 VITAMIN B-12: CPT

## 2022-02-28 PROCEDURE — 76536 US EXAM OF HEAD AND NECK: CPT

## 2022-02-28 PROCEDURE — 85613 RUSSELL VIPER VENOM DILUTED: CPT

## 2022-02-28 PROCEDURE — 86140 C-REACTIVE PROTEIN: CPT

## 2022-02-28 PROCEDURE — 77063 BREAST TOMOSYNTHESIS BI: CPT

## 2022-03-01 LAB
DSDNA AB SER IA-ACNC: <1 IUNITS/ML
SMA AB SER QL IA: NEGATIVE

## 2022-03-02 LAB
B2 GLYCOPROT1 IGA SERPL IA-ACNC: <20 SAU
B2 GLYCOPROT1 IGG SERPL IA-ACNC: <20 SGU
B2 GLYCOPROT1 IGM SERPL IA-ACNC: <20 SMU
CARDIOLIPIN IGA SER IA-ACNC: <20 APL
CARDIOLIPIN IGG SER IA-ACNC: <20 GPL
CARDIOLIPIN IGM SER IA-ACNC: <20 MPL
PATH REV: NORMAL
SCREEN APTT: 24.5 SEC (ref 22–30)
SCREEN DRVVT: 35.1 SEC
THROMBIN TIME: 18.5 SEC (ref 15.3–21.1)

## 2025-05-29 DIAGNOSIS — G31.84 MCI (MILD COGNITIVE IMPAIRMENT): Primary | ICD-10-CM

## 2025-06-13 ENCOUNTER — APPOINTMENT (OUTPATIENT)
Dept: PET IMAGING | Age: 50
End: 2025-06-13
Attending: PSYCHIATRY & NEUROLOGY

## 2025-07-24 DIAGNOSIS — M81.0 AGE-RELATED OSTEOPOROSIS WITHOUT CURRENT PATHOLOGICAL FRACTURE: Primary | ICD-10-CM

## 2025-07-25 ENCOUNTER — HOSPITAL ENCOUNTER (OUTPATIENT)
Dept: PET IMAGING | Age: 50
Discharge: HOME | End: 2025-07-25
Attending: PSYCHIATRY & NEUROLOGY

## 2025-07-25 DIAGNOSIS — G31.84 MCI (MILD COGNITIVE IMPAIRMENT): ICD-10-CM

## 2025-07-25 PROCEDURE — 10006150 HB RX 343: Performed by: PSYCHIATRY & NEUROLOGY

## 2025-07-25 PROCEDURE — 78814 PET IMAGE W/CT LMTD: CPT

## 2025-07-25 PROCEDURE — A9586 FLORBETAPIR F18: HCPCS | Performed by: PSYCHIATRY & NEUROLOGY

## 2025-07-25 RX ADMIN — FLORBETAPIR F 18 10.4 MILLICURIE: 51 INJECTION, SOLUTION INTRAVENOUS at 10:06
